# Patient Record
Sex: MALE | Race: OTHER | NOT HISPANIC OR LATINO | URBAN - METROPOLITAN AREA
[De-identification: names, ages, dates, MRNs, and addresses within clinical notes are randomized per-mention and may not be internally consistent; named-entity substitution may affect disease eponyms.]

---

## 2021-10-29 ENCOUNTER — EMERGENCY (EMERGENCY)
Facility: HOSPITAL | Age: 31
LOS: 0 days | Discharge: LEFT BEFORE DISCHARGE | End: 2021-10-29
Admitting: EMERGENCY MEDICINE
Payer: SELF-PAY

## 2021-10-29 ENCOUNTER — EMERGENCY (EMERGENCY)
Facility: HOSPITAL | Age: 31
LOS: 0 days | Discharge: HOME | End: 2021-10-29
Attending: EMERGENCY MEDICINE | Admitting: EMERGENCY MEDICINE
Payer: MEDICAID

## 2021-10-29 VITALS
RESPIRATION RATE: 20 BRPM | OXYGEN SATURATION: 97 % | HEART RATE: 79 BPM | SYSTOLIC BLOOD PRESSURE: 158 MMHG | TEMPERATURE: 98 F | WEIGHT: 177.91 LBS | DIASTOLIC BLOOD PRESSURE: 100 MMHG

## 2021-10-29 VITALS
DIASTOLIC BLOOD PRESSURE: 109 MMHG | HEART RATE: 68 BPM | SYSTOLIC BLOOD PRESSURE: 162 MMHG | RESPIRATION RATE: 20 BRPM | TEMPERATURE: 97 F | OXYGEN SATURATION: 98 %

## 2021-10-29 DIAGNOSIS — I10 ESSENTIAL (PRIMARY) HYPERTENSION: ICD-10-CM

## 2021-10-29 DIAGNOSIS — Z02.9 ENCOUNTER FOR ADMINISTRATIVE EXAMINATIONS, UNSPECIFIED: ICD-10-CM

## 2021-10-29 DIAGNOSIS — K08.89 OTHER SPECIFIED DISORDERS OF TEETH AND SUPPORTING STRUCTURES: ICD-10-CM

## 2021-10-29 DIAGNOSIS — Z53.21 PROCEDURE AND TREATMENT NOT CARRIED OUT DUE TO PATIENT LEAVING PRIOR TO BEING SEEN BY HEALTH CARE PROVIDER: ICD-10-CM

## 2021-10-29 PROCEDURE — L9991: CPT

## 2021-10-29 PROCEDURE — 99282 EMERGENCY DEPT VISIT SF MDM: CPT

## 2021-10-29 NOTE — ED PROVIDER NOTE - OBJECTIVE STATEMENT
30 y.o male w/ no sig pmhx presents to the ED for evaluation of L upper molar pain x 1 month.  intermittent, mild severity, throbbing, no radiation of pain.  No facial swelling, trismus, difficulty tolerating secretions.  No fever, chills/  No further complaints.

## 2021-10-29 NOTE — CONSULT NOTE ADULT - SUBJECTIVE AND OBJECTIVE BOX
Patient is a 30y old  Male who presents with a chief complaint of pain on #16    HPI: patient reports pain started 1 month ago, patient presented to his dentist and was prescribed antibiotics, Patient report the pain has worsened over the last few days.   Patient reports pain and when drinking hot. Gross decay noted on #16.      PAST MEDICAL & SURGICAL HISTORY:    ( -  ) heart valve replacement  ( -  ) joint replacement  ( -  ) pregnancy    MEDICATIONS  (STANDING): None    MEDICATIONS  (PRN): None      Allergies: No known drug allergy      Intolerances      *SOCIAL HISTORY: ( +  ) Tobacco; (-   ) ETOH    *Last Dental Visit: 1 month ago    Vital Signs Last 24 Hrs  T(C): 36.1 (29 Oct 2021 14:19), Max: 36.4 (29 Oct 2021 10:52)  T(F): 97 (29 Oct 2021 14:19), Max: 97.6 (29 Oct 2021 10:52)  HR: 68 (29 Oct 2021 14:19) (68 - 79)  BP: 162/109 (29 Oct 2021 14:19) (158/100 - 162/109)  BP(mean): --  RR: 20 (29 Oct 2021 14:19) (20 - 20)  SpO2: 98% (29 Oct 2021 14:19) (97% - 98%)    BP: 149/105  P: 67 at 13:30, 155/110 at 13:40, 171/113 at 14:10    EOE:  TMJ ( -  ) clicks                     (-   ) pops                     (-   ) crepitus             Mandible <<FROM>>             Facial bones and MOM <<grossly intact>>             ( -  ) trismus             ( -  ) lymphadenopathy             ( -  ) swelling             ( -  ) asymmetry             ( -  ) palpation             ( -  ) dyspnea             ( -  ) dysphagia             ( -  ) loss of consciousness    IOE:  <<permanent/>> dentition: <<grossly intact>>           hard/soft palate:  (   ) palatal torus, <<No pathology noted>>           tongue/FOM <<No pathology noted>>           labial/buccal mucosa <<No pathology noted>>           ( +  ) percussion           ( -  ) palpation           ( -  ) swelling            ( -  ) abscess           ( -  ) sinus tract          *DENTAL RADIOGRAPHS: 2 periapicals.       *ASSESSMENT: Gross decay noted on #16, #16 not restorable.      *PLAN: Extraction of #16    PROCEDURE:   Verbal and written consent given. Treatment consequences explained to the patient as per OS sheet dated 7/13/00, Consent obtained, Site, side signed.  Anesthesia: <<2 Carpules of 3% Mepivacaine and 3/4 of a carpule of 4% Septocaine with 1:100,000 epinephrine administered via local infiltration.   >>   Treatment: <<#16 was elevated and extracted, Socket curetted and irrigated with saline, hemostasis achieved, post operative radiograph taken, post operative instruction given verbally and written. Advised the patient to take over the counter pain medications as needed.  Patient was escorted to the emergency department due to high blood pressure BP: 149/105  P: 67 at 13:30, 155/110 at 13:40, 171/113 at 14:10      >>     RECOMMENDATIONS:  1) Take over the counter pain medications as needed.  2) Follow up with physician for evaluation of blood pressure.  3) Dental F/U with outpatient dentist for comprehensive dental care.   4) If any difficulty swallowing/breathing, fever occur, return to ER.     Resident Name, pager # Tam Chandler DDS, #[phone]

## 2021-10-29 NOTE — ED PROVIDER NOTE - NS ED ROS FT
Constitutional: See HPI.  Eyes: No visual changes, eye pain or discharge  ENMT: + dentalgia. No hearing changes, pain, discharge or infections.  Neuro: No headache or weakness.  Skin: No skin rash.  Except as documented in the HPI, all other systems are negative.

## 2021-10-29 NOTE — ED PROVIDER NOTE - PHYSICAL EXAMINATION
CONST: Well appearing in NAD  EYES: Sclera and conjunctiva clear.  ENT: mild TTP L upper molar, no abscess, no facial swelling.  Oropharynx normal appearing, no erythema or exudates. Uvula midline.  SKIN: Warm, dry, no acute rashes. Good turgor  NEURO: A&Ox3, No focal deficits. Strength 5/5 with no sensory deficits. Steady gait

## 2021-10-29 NOTE — ED ADULT TRIAGE NOTE - EXPLANATION OF PATIENT'S REASON FOR LEAVING
patient states he doesn't want to wait again and will go see his PMD, patient advised to come back for worsening

## 2021-12-06 ENCOUNTER — EMERGENCY (EMERGENCY)
Facility: HOSPITAL | Age: 31
LOS: 0 days | Discharge: HOME | End: 2021-12-06
Attending: EMERGENCY MEDICINE | Admitting: EMERGENCY MEDICINE
Payer: MEDICAID

## 2021-12-06 VITALS
WEIGHT: 173.94 LBS | DIASTOLIC BLOOD PRESSURE: 89 MMHG | HEART RATE: 81 BPM | OXYGEN SATURATION: 100 % | TEMPERATURE: 98 F | SYSTOLIC BLOOD PRESSURE: 140 MMHG | RESPIRATION RATE: 18 BRPM

## 2021-12-06 DIAGNOSIS — K08.89 OTHER SPECIFIED DISORDERS OF TEETH AND SUPPORTING STRUCTURES: ICD-10-CM

## 2021-12-06 DIAGNOSIS — K02.9 DENTAL CARIES, UNSPECIFIED: ICD-10-CM

## 2021-12-06 PROCEDURE — 99282 EMERGENCY DEPT VISIT SF MDM: CPT

## 2021-12-06 NOTE — ED ADULT TRIAGE NOTE - SOURCE OF INFORMATION
Tell her that her blood pressures show that we need to add another medication, as she is already maxed on the valsartan and carvedilol.  I recommend amlodipine 5 mg daily.   Patient

## 2021-12-06 NOTE — ED PROVIDER NOTE - PHYSICAL EXAMINATION
pt in NAD, AAOx3, head NC/AT, CN II-XII intact, mouth (-) gum swelling, (-) tongue swelling, airway intact, lungs CTA B/L, CV S1S2 regular, abdomen soft/NT/ND/(+)BS.

## 2021-12-06 NOTE — ED PROVIDER NOTE - OBJECTIVE STATEMENT
31 y.o. male here for 3 day h/o dental pain. No fever/chills. NO difficulty swallowing. No SOB. No change in voice quality.

## 2022-10-03 ENCOUNTER — EMERGENCY (EMERGENCY)
Facility: HOSPITAL | Age: 32
LOS: 0 days | Discharge: HOME | End: 2022-10-03
Attending: EMERGENCY MEDICINE | Admitting: EMERGENCY MEDICINE

## 2022-10-03 VITALS
DIASTOLIC BLOOD PRESSURE: 100 MMHG | WEIGHT: 171.96 LBS | RESPIRATION RATE: 18 BRPM | OXYGEN SATURATION: 100 % | HEART RATE: 78 BPM | SYSTOLIC BLOOD PRESSURE: 152 MMHG | TEMPERATURE: 97 F | HEIGHT: 64 IN

## 2022-10-03 DIAGNOSIS — Z91.14 PATIENT'S OTHER NONCOMPLIANCE WITH MEDICATION REGIMEN: ICD-10-CM

## 2022-10-03 DIAGNOSIS — I10 ESSENTIAL (PRIMARY) HYPERTENSION: ICD-10-CM

## 2022-10-03 DIAGNOSIS — K64.4 RESIDUAL HEMORRHOIDAL SKIN TAGS: ICD-10-CM

## 2022-10-03 PROCEDURE — 99282 EMERGENCY DEPT VISIT SF MDM: CPT

## 2022-10-03 NOTE — ED PROVIDER NOTE - CARE PROVIDER_API CALL
Ahmet Hutchison)  ColonRectal Surgery; Surgery  [address], [address] [place] [address]  Phone: [phone]  Fax: [phone]  Follow Up Time: Routine

## 2022-10-03 NOTE — ED PROVIDER NOTE - OBJECTIVE STATEMENT
31-year-old male past medical history of hypertension noncompliant with medications complains of hemorrhoids.  Patient states that over the last couple days he had intermittent streaks of blood paper after bowel movement.  Denies bright red blood per rectum or in the toilet bowl.  Patient denies any other symptoms no dizziness no lightheadedness no fever no shortness of breath.  Patient is an [employer]  states that he has pain when he sits for long periods of time patient made an appointment with Dr. Hutchison on October 16.

## 2022-10-03 NOTE — ED PROVIDER NOTE - PHYSICAL EXAMINATION
CONSTITUTIONAL: Well-appearing; well-nourished; in no apparent distress.   HEAD: Normocephalic; atraumatic.   EYES: PERRL; EOM intact. Conjunctiva normal B/L.   ENT: Normal pharynx with no tonsillar hypertrophy. MMM.  NECK: Supple; non-tender; no cervical lymphadenopathy.   CHEST: Normal chest excursion with respiration.   CARDIOVASCULAR: Normal S1, S2; no murmurs, rubs, or gallops.   RESPIRATORY: Normal chest excursion with respiration; breath sounds clear and equal bilaterally; no wheezes, rhonchi, or rales.  GI/: Normal bowel sounds; non-distended; non-tender.   exam performed w/ RN; 1cm external hemorrhoid, non thrombosed, no BRBPR, no internal hemorrhoids palpated, SUNITA neg  BACK: No evidence of trauma or deformity. Non-tender to palpation. No CVA tenderness.   EXT: Normal ROM in all four extremities; non-tender to palpation; distal pulses are normal. No leg edema B/L.   SKIN: Normal for age and race; warm; dry; good turgor.  NEURO: A & O x 4; CN 2-12 intact. Grossly unremarkable.
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
## 2022-10-03 NOTE — ED PROVIDER NOTE - PATIENT PORTAL LINK FT
You can access the FollowMyHealth Patient Portal offered by [facility] by registering at the following website: [email or web address]. By joining [employer]’s FollowMyHealth portal, you will also be able to view your health information using other applications (apps) compatible with our system.

## 2022-10-04 PROBLEM — Z00.00 ENCOUNTER FOR PREVENTIVE HEALTH EXAMINATION: Status: ACTIVE | Noted: 2022-10-04

## 2022-10-18 ENCOUNTER — NON-APPOINTMENT (OUTPATIENT)
Age: 32
End: 2022-10-18

## 2022-10-18 ENCOUNTER — APPOINTMENT (OUTPATIENT)
Dept: SURGERY | Facility: CLINIC | Age: 32
End: 2022-10-18

## 2022-10-18 VITALS
OXYGEN SATURATION: 98 % | HEIGHT: 64 IN | DIASTOLIC BLOOD PRESSURE: 90 MMHG | BODY MASS INDEX: 29.88 KG/M2 | HEART RATE: 79 BPM | SYSTOLIC BLOOD PRESSURE: 130 MMHG | TEMPERATURE: 98 F | WEIGHT: 175 LBS

## 2022-10-18 DIAGNOSIS — K59.09 OTHER CONSTIPATION: ICD-10-CM

## 2022-10-18 DIAGNOSIS — K64.4 RESIDUAL HEMORRHOIDAL SKIN TAGS: ICD-10-CM

## 2022-10-18 PROCEDURE — 46600 DIAGNOSTIC ANOSCOPY SPX: CPT

## 2022-10-18 PROCEDURE — 99203 OFFICE O/P NEW LOW 30 MIN: CPT | Mod: 25

## 2022-10-25 PROBLEM — K59.09 CHRONIC CONSTIPATION: Status: ACTIVE | Noted: 2022-10-25

## 2022-10-25 PROBLEM — K64.4 RESIDUAL HEMORRHOIDAL SKIN TAGS: Status: ACTIVE | Noted: 2022-10-25

## 2022-10-25 NOTE — PHYSICAL EXAM
[Abdomen Masses] : No abdominal masses [Abdomen Tenderness] : ~T No ~M abdominal tenderness [No HSM] : no hepatosplenomegaly [Excoriation] : no perianal excoriation [Fistula] : no fistulas [Wart] : no warts [Ulcer ___ cm] : no ulcers [Pilonidal Cyst] : no pilonidal cysts [Tender, Swollen] : nontender, non-swollen [Thrombosed] : that was not thrombosed [Skin Tags] : there were no residual hemorrhoidal skin tags seen [Normal] : was normal [None] : there was no rectal mass  [Respiratory Effort] : normal respiratory effort [Normal Rate and Rhythm] : normal rate and rhythm [de-identified] : extrernal exam shows a large RAL residual hemorrhoidal skin tag [de-identified] : awake, alert and in no acute distress

## 2022-10-25 NOTE — HISTORY OF PRESENT ILLNESS
[FreeTextEntry1] : 31M with no PMH presens for evaluation of hemorrhoids.  He has 3 years of intermittent pain and bleeding.  He has started taking metamucil.  He had BM every 2-3 days but now with fiber it has improved.  Recently his symptoms have improved. Patient denies fevers, chills, nausea, vomiting, abdominal pain, diarrhea, blood in the stool or unexpected weight loss.  Patient denies a family history of colon cancer rectal cancer or inflammatory bowel disease.  Patient has never had a colonoscopy.\par

## 2022-10-25 NOTE — PROCEDURE
[FreeTextEntry1] : SUNITA and anoscopy show normal sphincter tone, normal internal hemorrhoids and no masses.\par

## 2022-10-25 NOTE — ASSESSMENT
[FreeTextEntry1] : 31M with residual hemorrhoidal skin tag\par \par I discussed the benign nature of skin tags with the patient.  At this time given that it is small and asymptomatic I did not recommend excision.  His symptoms may be related to his hemorrhoids or possible healed fissure.  He will return for recurrent symtpoms.

## 2023-08-09 NOTE — ED ADULT NURSE NOTE - DRUG PRE-SCREENING (DAST -1)
Discharge instructions provided to patient and   All questions answered and both verbalize understanding    PICC line removed without complications, dressing applied    Patient discharged home safely with  Statement Selected

## 2024-01-27 PROBLEM — I10 ESSENTIAL (PRIMARY) HYPERTENSION: Chronic | Status: ACTIVE | Noted: 2022-10-03

## 2024-03-04 ENCOUNTER — APPOINTMENT (OUTPATIENT)
Dept: CARDIOLOGY | Facility: CLINIC | Age: 34
End: 2024-03-04

## 2025-05-14 ENCOUNTER — EMERGENCY (EMERGENCY)
Facility: HOSPITAL | Age: 35
LOS: 0 days | Discharge: HOME | End: 2025-05-14
Attending: EMERGENCY MEDICINE
Payer: MEDICAID

## 2025-05-14 VITALS
RESPIRATION RATE: 17 BRPM | SYSTOLIC BLOOD PRESSURE: 131 MMHG | OXYGEN SATURATION: 97 % | DIASTOLIC BLOOD PRESSURE: 93 MMHG | HEART RATE: 84 BPM

## 2025-05-14 VITALS
HEIGHT: 68 IN | RESPIRATION RATE: 18 BRPM | DIASTOLIC BLOOD PRESSURE: 100 MMHG | SYSTOLIC BLOOD PRESSURE: 152 MMHG | OXYGEN SATURATION: 100 % | HEART RATE: 67 BPM | WEIGHT: 169.98 LBS | TEMPERATURE: 98 F

## 2025-05-14 DIAGNOSIS — R07.9 CHEST PAIN, UNSPECIFIED: ICD-10-CM

## 2025-05-14 DIAGNOSIS — R06.9 UNSPECIFIED ABNORMALITIES OF BREATHING: ICD-10-CM

## 2025-05-14 LAB
ALBUMIN SERPL ELPH-MCNC: 4.7 G/DL — SIGNIFICANT CHANGE UP (ref 3.5–5.2)
ALP SERPL-CCNC: 117 U/L — HIGH (ref 30–115)
ALT FLD-CCNC: 85 U/L — HIGH (ref 0–41)
ANION GAP SERPL CALC-SCNC: 11 MMOL/L — SIGNIFICANT CHANGE UP (ref 7–14)
AST SERPL-CCNC: 35 U/L — SIGNIFICANT CHANGE UP (ref 0–41)
BASOPHILS # BLD AUTO: 0.07 K/UL — SIGNIFICANT CHANGE UP (ref 0–0.2)
BASOPHILS NFR BLD AUTO: 1 % — SIGNIFICANT CHANGE UP (ref 0–1)
BILIRUB SERPL-MCNC: 0.3 MG/DL — SIGNIFICANT CHANGE UP (ref 0.2–1.2)
BUN SERPL-MCNC: 8 MG/DL — LOW (ref 10–20)
CALCIUM SERPL-MCNC: 9.6 MG/DL — SIGNIFICANT CHANGE UP (ref 8.4–10.5)
CHLORIDE SERPL-SCNC: 103 MMOL/L — SIGNIFICANT CHANGE UP (ref 98–110)
CO2 SERPL-SCNC: 27 MMOL/L — SIGNIFICANT CHANGE UP (ref 17–32)
CREAT SERPL-MCNC: 0.8 MG/DL — SIGNIFICANT CHANGE UP (ref 0.7–1.5)
D DIMER BLD IA.RAPID-MCNC: <150 NG/ML DDU — SIGNIFICANT CHANGE UP
EGFR: 118 ML/MIN/1.73M2 — SIGNIFICANT CHANGE UP
EGFR: 118 ML/MIN/1.73M2 — SIGNIFICANT CHANGE UP
EOSINOPHIL # BLD AUTO: 0.36 K/UL — SIGNIFICANT CHANGE UP (ref 0–0.7)
EOSINOPHIL NFR BLD AUTO: 4.9 % — SIGNIFICANT CHANGE UP (ref 0–8)
GLUCOSE SERPL-MCNC: 104 MG/DL — HIGH (ref 70–99)
HCT VFR BLD CALC: 49.3 % — SIGNIFICANT CHANGE UP (ref 42–52)
HGB BLD-MCNC: 15.9 G/DL — SIGNIFICANT CHANGE UP (ref 14–18)
IMM GRANULOCYTES NFR BLD AUTO: 0.1 % — SIGNIFICANT CHANGE UP (ref 0.1–0.3)
LIDOCAIN IGE QN: 31 U/L — SIGNIFICANT CHANGE UP (ref 7–60)
LYMPHOCYTES # BLD AUTO: 3.34 K/UL — SIGNIFICANT CHANGE UP (ref 1.2–3.4)
LYMPHOCYTES # BLD AUTO: 45.4 % — SIGNIFICANT CHANGE UP (ref 20.5–51.1)
MAGNESIUM SERPL-MCNC: 2.2 MG/DL — SIGNIFICANT CHANGE UP (ref 1.8–2.4)
MCHC RBC-ENTMCNC: 25.7 PG — LOW (ref 27–31)
MCHC RBC-ENTMCNC: 32.3 G/DL — SIGNIFICANT CHANGE UP (ref 32–37)
MCV RBC AUTO: 79.8 FL — LOW (ref 80–94)
MONOCYTES # BLD AUTO: 0.61 K/UL — HIGH (ref 0.1–0.6)
MONOCYTES NFR BLD AUTO: 8.3 % — SIGNIFICANT CHANGE UP (ref 1.7–9.3)
NEUTROPHILS # BLD AUTO: 2.97 K/UL — SIGNIFICANT CHANGE UP (ref 1.4–6.5)
NEUTROPHILS NFR BLD AUTO: 40.3 % — LOW (ref 42.2–75.2)
NRBC BLD AUTO-RTO: 0 /100 WBCS — SIGNIFICANT CHANGE UP (ref 0–0)
NT-PROBNP SERPL-SCNC: <36 PG/ML — SIGNIFICANT CHANGE UP (ref 0–300)
PLATELET # BLD AUTO: 386 K/UL — SIGNIFICANT CHANGE UP (ref 130–400)
PMV BLD: 9.4 FL — SIGNIFICANT CHANGE UP (ref 7.4–10.4)
POTASSIUM SERPL-MCNC: 3.7 MMOL/L — SIGNIFICANT CHANGE UP (ref 3.5–5)
POTASSIUM SERPL-SCNC: 3.7 MMOL/L — SIGNIFICANT CHANGE UP (ref 3.5–5)
PROT SERPL-MCNC: 7.2 G/DL — SIGNIFICANT CHANGE UP (ref 6–8)
RBC # BLD: 6.18 M/UL — HIGH (ref 4.7–6.1)
RBC # FLD: 13.1 % — SIGNIFICANT CHANGE UP (ref 11.5–14.5)
SODIUM SERPL-SCNC: 141 MMOL/L — SIGNIFICANT CHANGE UP (ref 135–146)
TROPONIN T, HIGH SENSITIVITY RESULT: <6 NG/L — SIGNIFICANT CHANGE UP (ref 6–21)
TROPONIN T, HIGH SENSITIVITY RESULT: <6 NG/L — SIGNIFICANT CHANGE UP (ref 6–21)
WBC # BLD: 7.36 K/UL — SIGNIFICANT CHANGE UP (ref 4.8–10.8)
WBC # FLD AUTO: 7.36 K/UL — SIGNIFICANT CHANGE UP (ref 4.8–10.8)

## 2025-05-14 PROCEDURE — 36415 COLL VENOUS BLD VENIPUNCTURE: CPT

## 2025-05-14 PROCEDURE — 99285 EMERGENCY DEPT VISIT HI MDM: CPT

## 2025-05-14 PROCEDURE — 85025 COMPLETE CBC W/AUTO DIFF WBC: CPT

## 2025-05-14 PROCEDURE — 80053 COMPREHEN METABOLIC PANEL: CPT

## 2025-05-14 PROCEDURE — 85379 FIBRIN DEGRADATION QUANT: CPT

## 2025-05-14 PROCEDURE — 71046 X-RAY EXAM CHEST 2 VIEWS: CPT

## 2025-05-14 PROCEDURE — 83880 ASSAY OF NATRIURETIC PEPTIDE: CPT

## 2025-05-14 PROCEDURE — 71046 X-RAY EXAM CHEST 2 VIEWS: CPT | Mod: 26

## 2025-05-14 PROCEDURE — 84484 ASSAY OF TROPONIN QUANT: CPT

## 2025-05-14 PROCEDURE — 83735 ASSAY OF MAGNESIUM: CPT

## 2025-05-14 PROCEDURE — 83690 ASSAY OF LIPASE: CPT

## 2025-05-14 PROCEDURE — 99284 EMERGENCY DEPT VISIT MOD MDM: CPT | Mod: 25

## 2025-05-14 PROCEDURE — 96374 THER/PROPH/DIAG INJ IV PUSH: CPT

## 2025-05-14 RX ADMIN — Medication 20 MILLIGRAM(S): at 16:57

## 2025-05-14 NOTE — ED ADULT NURSE NOTE - CCCP TRG CHIEF CMPLNT
chest pain Terbinafine Counseling: Patient counseling regarding adverse effects of terbinafine including but not limited to headache, diarrhea, rash, upset stomach, liver function test abnormalities, itching, taste/smell disturbance, nausea, abdominal pain, and flatulence.  There is a rare possibility of liver failure that can occur when taking terbinafine.  The patient understands that a baseline LFT and kidney function test may be required. The patient verbalized understanding of the proper use and possible adverse effects of terbinafine.  All of the patient's questions and concerns were addressed.

## 2025-05-14 NOTE — ED PROVIDER NOTE - NSFOLLOWUPINSTRUCTIONS_ED_ALL_ED_FT
Our Emergency Department Referral Coordinators will be reaching out to you in the next 24-48 hours from 9:00am to 5:00pm to schedule a follow up appointment. Please expect a phone call from the hospital in that time frame. If you do not receive a call or if you have any questions or concerns, you can reach them at   [phone].     Chest Pain    Chest pain can be caused by many different conditions which may or may not be dangerous. Causes include heartburn, lung infections, heart attack, blood clot in lungs, skin infections, strain or damage to muscle, cartilage, or bones, etc. In addition to a history and physical examination, an electrocardiogram (ECG) or other lab tests may have been performed to determine the cause of your chest pain. Follow up with your primary care provider or with a cardiologist as instructed.     SEEK IMMEDIATE MEDICAL CARE IF YOU HAVE ANY OF THE FOLLOWING SYMPTOMS: worsening chest pain, coughing up blood, unexplained back/neck/jaw pain, severe abdominal pain, dizziness or lightheadedness, fainting, shortness of breath, sweaty or clammy skin, vomiting, or racing heart beat. These symptoms may represent a serious problem that is an emergency. Do not wait to see if the symptoms will go away. Get medical help right away. Call 911 and do not drive yourself to the hospital.

## 2025-05-14 NOTE — ED PROVIDER NOTE - ATTENDING APP SHARED VISIT CONTRIBUTION OF CARE
I have personally performed a history and physical exam on this patient and personally directed the management of the patient. Patient is a 34-year-old male presents for evaluation of chest pain over the past 48 hours denies any associated shortness of breath nausea vomiting diaphoresis back pain abdominal pain denies any numbness tingling extremities    On physical exam overall patient is normocephalic atraumatic pupils equal round reactive light accommodation extraocular muscles intact oropharynx clear chest clear to auscultation bilaterally abdomen soft nontender nondistended bowel sounds positive no guarding rebound extremities full range of motion pedal pulses 2+ radial pulses 2+    Assessment plan patient presents for evaluation of chest pain worse with movement worse with deep breathing onset for the past 24 to 48 hours with radiation down the left upper extremity reobtain EKG per my independent evaluation accessible STEMI it is consistent with S1Q3T3 routine EKG not consistent with QT prolongation per my independent valuation maintain chest x-ray per my independent evaluation oxacillin pneumonia pneumothorax we obtained labs including troponin as well as D-dimer D-dimer negative unlikely PE in addition pain is not tearing radiating to the back in improving not consistent with TAD we obtained troponin x 2 we will continue to monitor overall heart score less than 5 I have personally performed a history and physical exam on this patient and personally directed the management of the patient. Patient is a 34-year-old male presents for evaluation of chest pain over the past 48 hours denies any associated shortness of breath nausea vomiting diaphoresis back pain abdominal pain denies any numbness tingling extremities.    On physical exam overall patient is normocephalic atraumatic pupils equal round reactive light accommodation extraocular muscles intact oropharynx clear chest clear to auscultation bilaterally abdomen soft nontender nondistended bowel sounds positive no guarding rebound extremities full range of motion pedal pulses 2+ radial pulses 2+    Assessment plan patient presents for evaluation of chest pain worse with movement worse with deep breathing onset for the past 24 to 48 hours with radiation down the left upper extremity reobtain EKG per my independent evaluation accessible STEMI it is consistent with S1Q3T3 routine EKG not consistent with QT prolongation per my independent valuation maintain chest x-ray per my independent evaluation oxacillin pneumonia pneumothorax we obtained labs including troponin as well as D-dimer D-dimer negative unlikely PE in addition pain is not tearing radiating to the back in improving not consistent with TAD we obtained troponin x 2 we will continue to monitor overall heart score less than 5

## 2025-05-14 NOTE — ED PROVIDER NOTE - PHYSICAL EXAMINATION
On physical exam overall patient is normocephalic atraumatic pupils equal round reactive light accommodation extraocular muscles intact oropharynx clear chest clear to auscultation bilaterally abdomen soft nontender nondistended bowel sounds positive no guarding rebound extremities full range of motion pedal pulses 2+ radial pulses 2+

## 2025-05-14 NOTE — ED PROVIDER NOTE - CLINICAL SUMMARY MEDICAL DECISION MAKING FREE TEXT BOX
I have personally performed a history and physical exam on this patient and personally directed the management of the patient. Patient is a 34-year-old male presents for evaluation of chest pain over the past 48 hours denies any associated shortness of breath nausea vomiting diaphoresis back pain abdominal pain denies any numbness tingling extremities    On physical exam overall patient is normocephalic atraumatic pupils equal round reactive light accommodation extraocular muscles intact oropharynx clear chest clear to auscultation bilaterally abdomen soft nontender nondistended bowel sounds positive no guarding rebound extremities full range of motion pedal pulses 2+ radial pulses 2+    Assessment plan patient presents for evaluation of chest pain worse with movement worse with deep breathing onset for the past 24 to 48 hours with radiation down the left upper extremity reobtain EKG per my independent evaluation accessible STEMI it is consistent with S1Q3T3 routine EKG not consistent with QT prolongation per my independent valuation maintain chest x-ray per my independent evaluation oxacillin pneumonia pneumothorax we obtained labs including troponin as well as D-dimer D-dimer negative unlikely PE in addition pain is not tearing radiating to the back in improving not consistent with TAD we obtained troponin x 2 we will continue to monitor overall heart score less than 5

## 2025-05-14 NOTE — ED PROVIDER NOTE - CARE PROVIDER_API CALL
Lela Marques  Internal Medicine  [address] [place] [address]  Phone: [phone]  Fax: [phone]  Follow Up Time: 1-3 Days

## 2025-05-14 NOTE — ED PROVIDER NOTE - OBJECTIVE STATEMENT
Patient is a 34-year-old male presents for evaluation of chest pain over the past 48 hours denies any associated shortness of breath nausea vomiting diaphoresis back pain abdominal pain denies any numbness tingling extremities

## 2025-05-14 NOTE — ED PROVIDER NOTE - PATIENT PORTAL LINK FT
You can access the FollowMyHealth Patient Portal offered by [employer] by registering at the following website: [email or web address]. By joining [employer]’s FollowMyHealth portal, you will also be able to view your health information using other applications (apps) compatible with our system.

## 2025-05-25 ENCOUNTER — EMERGENCY (EMERGENCY)
Facility: HOSPITAL | Age: 35
LOS: 0 days | Discharge: HOME | End: 2025-05-25
Attending: EMERGENCY MEDICINE
Payer: MEDICAID

## 2025-05-25 VITALS
HEART RATE: 89 BPM | HEIGHT: 68 IN | DIASTOLIC BLOOD PRESSURE: 106 MMHG | SYSTOLIC BLOOD PRESSURE: 163 MMHG | TEMPERATURE: 98 F | RESPIRATION RATE: 18 BRPM | OXYGEN SATURATION: 99 %

## 2025-05-25 PROCEDURE — 96372 THER/PROPH/DIAG INJ SC/IM: CPT

## 2025-05-25 PROCEDURE — 99284 EMERGENCY DEPT VISIT MOD MDM: CPT

## 2025-05-25 PROCEDURE — 99283 EMERGENCY DEPT VISIT LOW MDM: CPT | Mod: 25

## 2025-05-25 PROCEDURE — 99283 EMERGENCY DEPT VISIT LOW MDM: CPT

## 2025-05-25 RX ORDER — DIBUCAINE 10 MG/G
1 OINTMENT TOPICAL
Qty: 1 | Refills: 0
Start: 2025-05-25 | End: 2025-05-31

## 2025-05-25 RX ORDER — KETOROLAC TROMETHAMINE 30 MG/ML
30 INJECTION, SOLUTION INTRAMUSCULAR; INTRAVENOUS ONCE
Refills: 0 | Status: DISCONTINUED | OUTPATIENT
Start: 2025-05-25 | End: 2025-05-25

## 2025-05-25 RX ORDER — ACETAMINOPHEN 500 MG/5ML
650 LIQUID (ML) ORAL ONCE
Refills: 0 | Status: COMPLETED | OUTPATIENT
Start: 2025-05-25 | End: 2025-05-25

## 2025-05-25 RX ORDER — PHENYLEPHRINE HYDROCHLORIDE AND FAT, HARD .00525; 1.86 G/1; G/1
1 SUPPOSITORY RECTAL ONCE
Refills: 0 | Status: DISCONTINUED | OUTPATIENT
Start: 2025-05-25 | End: 2025-05-25

## 2025-05-25 RX ADMIN — KETOROLAC TROMETHAMINE 30 MILLIGRAM(S): 30 INJECTION, SOLUTION INTRAMUSCULAR; INTRAVENOUS at 18:47

## 2025-05-25 RX ADMIN — Medication 650 MILLIGRAM(S): at 18:47

## 2025-05-25 NOTE — ED ADULT NURSE NOTE - NSFALLUNIVINTERV_ED_ALL_ED
Bed/Stretcher in lowest position, wheels locked, appropriate side rails in place/Call bell, personal items and telephone in reach/Instruct patient to call for assistance before getting out of bed/chair/stretcher/Non-slip footwear applied when patient is off stretcher/[place] to call system/Physically safe environment - no spills, clutter or unnecessary equipment/Purposeful proactive rounding/Room/bathroom lighting operational, light cord in reach

## 2025-05-25 NOTE — CONSULT NOTE ADULT - ASSESSMENT
ASSESSMENT:   Patient is a 34 year old male with a past medical history of hypertension who presents to the ED complaining of rectal pain. Patient states he has had hemorrhoids for the past 3-4 years. Patient states his rectal pain from the hemorrhoids come. Colorectal surgery consulted for evaluation of rectal pain due to hemorrhoids. Physical exam findings, imaging, and labs as documented above.     PLAN:  - No acute surgical intervention   - Lidocaine cream named Recticare, apply four times a day as needed   - Sitz baths twice a day  - Stool Softener - Metamucil or Miralax   - Follow up with Dr. Lopez in the office   - Dispo per ED     Above plan discussed with Attending Surgeon Dr. Lopez, patient, patient family, and Primary team  05-25-25 @ 20:35

## 2025-05-25 NOTE — ED PROVIDER NOTE - PATIENT PORTAL LINK FT
You can access the FollowMyHealth Patient Portal offered by [facility] by registering at the following website: [email or web address]. By joining [employer]’s FollowMyHealth portal, you will also be able to view your health information using other applications (apps) compatible with our system.

## 2025-05-25 NOTE — ED ADULT TRIAGE NOTE - GLASGOW COMA SCALE: EYE OPENING, MLM
well developed, well nourished , in no acute distress , ambulating without difficulty , normal communication ability (E4) spontaneous

## 2025-05-25 NOTE — ED PROVIDER NOTE - CARE PROVIDER_API CALL
Ana Luisa Lopez  Colon/Rectal Surgery  [address] [place] [address]  Phone: [phone]  Fax: [phone]  Follow Up Time: 1-3 Days

## 2025-05-25 NOTE — ED PROVIDER NOTE - PROGRESS NOTE DETAILS
Patient permission obtained for rectal exam: Kyle is ED PCA:  Caridad Mclean. Feeling well this morning.  Denies any new symptoms.  Sitting up in chair in no acute distress.     BPs elevated overnight, and carvedilol dose increased.     Remaining ROS negative       PHYSICAL EXAM:      General: no acute distress, sitting up in bed  HEENT: NC/AT; MMM, chronic left sided facial droop from bells palsy  Cardiovascular: +S1/S2, RRR, no mrg  Respiratory: CTA B/L; no W/R/R  Gastrointestinal: soft, NT/ND; +BSx4  Extremities: WWP; no edema  Neurological: no acute deficits noted, speech is fluent, moves extremities and follows commands  Psychiatric: pleasant mood and affect  Dermatologic: no appreciable wounds or damage to the skin    VITAL SIGNS:  Vital Signs Last 24 Hrs  T(C): 36.5 (2023 08:41), Max: 36.9 (2023 13:51)  T(F): 97.7 (2023 08:41), Max: 98.4 (2023 13:51)  HR: 68 (2023 08:27) (57 - 68)  BP: 153/73 (2023 08:27) (144/84 - 210/86)  BP(mean): 105 (2023 08:27) (95 - 124)  RR: 18 (2023 08:27) (14 - 19)  SpO2: 97% (2023 08:27) (95% - 98%)    Parameters below as of 2023 08:27  Patient On (Oxygen Delivery Method): room air          MEDICATIONS:  MEDICATIONS  (STANDING):  amLODIPine   Tablet 10 milliGRAM(s) Oral daily  aspirin enteric coated 81 milliGRAM(s) Oral daily  atorvastatin 80 milliGRAM(s) Oral at bedtime  carvedilol 6.25 milliGRAM(s) Oral every 12 hours  clopidogrel Tablet 75 milliGRAM(s) Oral daily  dextrose 5%. 1000 milliLiter(s) (50 mL/Hr) IV Continuous <Continuous>  dextrose 5%. 1000 milliLiter(s) (100 mL/Hr) IV Continuous <Continuous>  dextrose 50% Injectable 25 Gram(s) IV Push once  dextrose 50% Injectable 25 Gram(s) IV Push once  dextrose 50% Injectable 12.5 Gram(s) IV Push once  glucagon  Injectable 1 milliGRAM(s) IntraMuscular once  heparin   Injectable 7500 Unit(s) SubCutaneous every 8 hours  insulin glargine Injectable (LANTUS) 5 Unit(s) SubCutaneous at bedtime  insulin lispro (ADMELOG) corrective regimen sliding scale   SubCutaneous Before meals and at bedtime  insulin lispro Injectable (ADMELOG) 7 Unit(s) SubCutaneous three times a day before meals  lisinopril 40 milliGRAM(s) Oral daily  polyethylene glycol 3350 17 Gram(s) Oral daily  senna 2 Tablet(s) Oral at bedtime    MEDICATIONS  (PRN):  acetaminophen     Tablet .. 650 milliGRAM(s) Oral every 6 hours PRN Temp greater or equal to 38C (100.4F), Moderate Pain (4 - 6)  dextrose Oral Gel 15 Gram(s) Oral once PRN Blood Glucose LESS THAN 70 milliGRAM(s)/deciliter      ALLERGIES:  Allergies    codeine (Unknown)    Intolerances        LABS:                        11.0   6.15  )-----------( 199      ( 2023 05:30 )             32.7     06-09    138  |  107  |  26<H>  ----------------------------<  74  4.3   |  21<L>  |  1.47<H>    Ca    8.0<L>      2023 05:30  Phos  3.7     06-09  Mg     1.9     06-09        Urinalysis Basic - ( 2023 16:40 )    Color: Yellow / Appearance: Clear / S.020 / pH: x  Gluc: x / Ketone: NEGATIVE  / Bili: Negative / Urobili: 0.2 E.U./dL   Blood: x / Protein: 100 mg/dL / Nitrite: NEGATIVE   Leuk Esterase: NEGATIVE / RBC: < 5 /HPF / WBC 5-10 /HPF   Sq Epi: x / Non Sq Epi: x / Bacteria: Present /HPF      CAPILLARY BLOOD GLUCOSE      POCT Blood Glucose.: 154 mg/dL (2023 11:23)      RADIOLOGY & ADDITIONAL TESTS: Reviewed. thorough discussion with patient and family had at bedside regarding risks and benefits of lidocaine gel for symptomatic control. patient and family educated on importance of medication compliance and avoiding overuse to prevent lidocaine toxicity. thorough discussion regarding side effects of excessive lidocaine use discussed and patient verbalizes understanding. patient evaluated by surgery, plan to have patient fu with colorectal surgery for outpatient fu. patient educated on strict signs and symptoms to be aware of that should prompt return to the er. ck: thorough discussion with patient and family had at bedside regarding risks and benefits of lidocaine gel for symptomatic control. patient and family educated on importance of medication compliance and avoiding overuse to prevent lidocaine toxicity. thorough discussion regarding side effects of excessive lidocaine use discussed and patient verbalizes understanding. patient evaluated by surgery, plan to have patient fu with colorectal surgery for outpatient fu. patient educated on strict signs and symptoms to be aware of that should prompt return to the er.

## 2025-05-25 NOTE — CONSULT NOTE ADULT - SUBJECTIVE AND OBJECTIVE BOX
GENERAL SURGERY CONSULT NOTE    Patient: JOSSELINE THOMASON , 34y ([birth year])Male   MRN: [id number]  Location: [facility] ED  Visit: 05-25-25 Emergency  Date: 05-25-25 @ 20:35    HPI: Patient is a 34 year old male with a past medical history of hypertension who presents to the ED complaining of rectal pain. Patient states he has had hemorrhoids for the past 3-4 years. Patient states his rectal pain from the hemorrhoids comes and goes however for the past three days he has had a constant worsening pain. Patient has tried to apply Preparation-H over the counter cream, but found no relief. Patient states he has difficulties having bowel movements due to the pain. Patient denies any bright red blood per rectum, changes in urination, abdominal pain, or any other symptoms. Colorectal surgery consulted for evaluation of rectal pain due to hemorrhoids.       PAST MEDICAL & SURGICAL HISTORY:  HTN (hypertension)    High cholesterol    No significant past surgical history    Home Medications:    VITALS:  T(F): 98.2 (05-25-25 @ 17:53), Max: 98.2 (05-25-25 @ 17:53)  HR: 89 (05-25-25 @ 17:53) (89 - 89)  BP: 163/106 (05-25-25 @ 17:53) (163/106 - 163/106)  RR: 18 (05-25-25 @ 17:53) (18 - 18)  SpO2: 99% (05-25-25 @ 17:53) (99% - 99%)    PHYSICAL EXAM:  General: NAD, AAOx3, calm and cooperative  HEENT: NCAT, MARGRET, EOMI  Cardiac: RRR S1, S2, no Murmurs, rubs or gallops  Respiratory: CTAB, normal respiratory effort,   Abdomen: Soft, non-distended, non-tender  Skin: Warm/dry, normal color, no jaundice  Rectal Exam: Multiple external hemorrhoids noted, tender to palpation, soft and reducible. One external thrombosed hemorrhoid noted on exam. No significant cyanosis perirectal masses or lesions noted on exam.     MEDICATIONS  (STANDING):  hemorrhoidal Ointment 1 Application(s) Rectal Once    MEDICATIONS  (PRN):      LAB/STUDIES:

## 2025-05-25 NOTE — ED PROVIDER NOTE - OBJECTIVE STATEMENT
34 year old male, past medical history htn, who presents with rectal pain. patine with recurrent hemorrhoids x4 years, worsening pain x3 days. denies f/c, abd pain, nausea/vomiting, bowel changes.

## 2025-05-25 NOTE — ED PROVIDER NOTE - NSFOLLOWUPINSTRUCTIONS_ED_ALL_ED_FT
Please follow up with your primary care doctor and colorectal surgery in 1-3 days  Please be aware of any new or worsening signs or symptoms that should prompt your return to the ER.    Hemorrhoids  ImageHemorrhoids are swollen veins in and around the rectum or anus. There are two types of hemorrhoids:  Internal hemorrhoids. These occur in the veins that are just inside the rectum. They may poke through to the outside and become irritated and painful.External hemorrhoids. These occur in the veins that are outside the anus and can be felt as a painful swelling or hard lump near the anus.Most hemorrhoids do not cause serious problems, and they can be managed with home treatments such as diet and lifestyle changes. If home treatments do not help the symptoms, procedures can be done to shrink or remove the hemorrhoids.  What are the causes?  This condition is caused by increased pressure in the anal area. This pressure may result from various things, including:  Constipation.Straining to have a bowel movement.Diarrhea.Pregnancy.Obesity.Sitting for long periods of time.Heavy lifting or other activity that causes you to strain.Anal sex.Riding a bike for a long period of time.What are the signs or symptoms?  Symptoms of this condition include:  Pain.Anal itching or irritation.Rectal bleeding.Leakage of stool (feces).Anal swelling.One or more lumps around the anus.How is this diagnosed?  This condition can often be diagnosed through a visual exam. Other exams or tests may also be done, such as:  An exam that involves feeling the rectal area with a gloved hand (digital rectal exam).An exam of the anal canal that is done using a small tube (anoscope).A blood test, if you have lost a significant amount of blood.A test to look inside the colon using a flexible tube with a camera on the end (sigmoidoscopy or colonoscopy).How is this treated?  This condition can usually be treated at home. However, various procedures may be done if dietary changes, lifestyle changes, and other home treatments do not help your symptoms. These procedures can help make the hemorrhoids smaller or remove them completely. Some of these procedures involve surgery, and others do not. Common procedures include:  Rubber band ligation. Rubber bands are placed at the base of the hemorrhoids to cut off their blood supply.Sclerotherapy. Medicine is injected into the hemorrhoids to shrink them.Infrared coagulation. A type of light energy is used to get rid of the hemorrhoids.Hemorrhoidectomy surgery. The hemorrhoids are surgically removed, and the veins that supply them are tied off.Stapled hemorrhoidopexy surgery. The surgeon staples the base of the hemorrhoid to the rectal wall.Follow these instructions at home:  Eating and drinking     Eat foods that have a lot of fiber in them, such as whole grains, beans, nuts, fruits, and vegetables.Ask your health care provider about taking products that have added fiber (fiber supplements).Reduce the amount of fat in your diet. You can do this by eating low-fat dairy products, eating less red meat, and avoiding processed foods.Drink enough fluid to keep your urine pale yellow.Managing pain and swelling     Take warm sitz baths for 20 minutes, 3–4 times a day to ease pain and discomfort. You may do this in a bathtub or using a portable sitz bath that fits over the toilet.If directed, apply ice to the affected area. Using ice packs between sitz baths may be helpful.  Put ice in a plastic bag.Place a towel between your skin and the bag.Leave the ice on for 20 minutes, 2–3 times a day.General instructions     Take over-the-counter and prescription medicines only as told by your health care provider.Use medicated creams or suppositories as told.Get regular exercise. Ask your health care provider how much and what kind of exercise is best for you. In general, you should do moderate exercise for at least 30 minutes on most days of the week (150 minutes each week). This can include activities such as walking, biking, or yoga.Go to the bathroom when you have the urge to have a bowel movement. Do not wait.Avoid straining to have bowel movements.Keep the anal area dry and clean. Use wet toilet paper or moist towelettes after a bowel movement.Do not sit on the toilet for long periods of time. This increases blood pooling and pain.Keep all follow-up visits as told by your health care provider. This is important.Contact a health care provider if you have:  Increasing pain and swelling that are not controlled by treatment or medicine.Difficulty having a bowel movement, or you are unable to have a bowel movement.Pain or inflammation outside the area of the hemorrhoids.Get help right away if you have:  Uncontrolled bleeding from your rectum.Summary  Hemorrhoids are swollen veins in and around the rectum or anus.Most hemorrhoids can be managed with home treatments such as diet and lifestyle changes.Taking warm sitz baths can help ease pain and discomfort.In severe cases, procedures or surgery can be done to shrink or remove the hemorrhoids.This information is not intended to replace advice given to you by your health care provider. Make sure you discuss any questions you have with your health care provider.

## 2025-05-25 NOTE — ED PROVIDER NOTE - PHYSICAL EXAMINATION
CONSTITUTIONAL: no acute distress  SKIN: skin exam is warm and dry  ENT: MMM    ABD: soft; non-distended; non-tender. No Rebound, No guarding. performed by dr moore: external thrombosed hemorrhoids, no bleeding  NEURO: awake, alert, following commands, oriented, grossly unremarkable. No Focal deficits. GCS 15.   PSYCH: Cooperative, appropriate.

## 2025-05-25 NOTE — ED PROVIDER NOTE - ATTENDING APP SHARED VISIT CONTRIBUTION OF CARE
I have personally performed a history and physical exam on this patient. I have personally directed the management of the patient.  Patient is c/o painful hemorrhoids. Denies trauma. Denies f/c/urinary symptoms. Denies  symptoms.   Vitals reviewed.   Patient is awake, alert, answering questions appropriately, appears comfortable and not in any distress.  Patient permission obtained for rectal exam: Chaperon is ED PCA:  Caridad Mclean.  Abd: +BS, NT, ND, soft, no rebound, no guarding. No CVA tenderness, no rash.  Rectal exam: +tender external hemorrhoid, no perirectal abscess, no discharge noted, no cellulitis.

## 2025-05-25 NOTE — ED ADULT TRIAGE NOTE - NS ED TRIAGE AVPU SCALE
Apixaban/Eliquis is used to treat and prevent blood clots. If you are not able to swallow the tablets whole, they may be crushed and mixed in water, apple juice, or applesauce and promptly taken within four hours. Never skip a dose of Apixaban/Eliquis. If you forget to take your Apixaban/Eliquis, take a dose as soon as you remember. If it is almost time for your next Apixaban/Eliquis dose, wait until then and take a regular dose. DO NOT take an extra pill to ‘catch up’.  NEVER TAKE A DOUBLE DOSE. Notify your doctor that you missed a dose. Take Apixaban/Eliquis at the same time each morning and evening. Apixaban/Eliquis may be taken with other medication or food.
Alert-The patient is alert, awake and responds to voice. The patient is oriented to time, place, and person. The triage nurse is able to obtain subjective information.

## 2025-05-28 ENCOUNTER — APPOINTMENT (OUTPATIENT)
Dept: SURGERY | Facility: CLINIC | Age: 35
End: 2025-05-28

## 2025-06-04 ENCOUNTER — APPOINTMENT (OUTPATIENT)
Dept: SURGERY | Facility: CLINIC | Age: 35
End: 2025-06-04